# Patient Record
Sex: FEMALE | Race: WHITE | Employment: UNEMPLOYED | URBAN - METROPOLITAN AREA
[De-identification: names, ages, dates, MRNs, and addresses within clinical notes are randomized per-mention and may not be internally consistent; named-entity substitution may affect disease eponyms.]

---

## 2018-02-27 ENCOUNTER — HOSPITAL ENCOUNTER (OUTPATIENT)
Dept: GENERAL RADIOLOGY | Age: 18
Discharge: HOME | End: 2018-02-27
Payer: OTHER GOVERNMENT

## 2018-02-27 DIAGNOSIS — M54.10 RADICULOPATHY AFFECTING UPPER EXTREMITY: ICD-10-CM

## 2018-02-27 PROCEDURE — 72050 X-RAY EXAM NECK SPINE 4/5VWS: CPT

## 2019-08-07 ENCOUNTER — APPOINTMENT (OUTPATIENT)
Dept: GENERAL RADIOLOGY | Age: 19
End: 2019-08-07
Attending: EMERGENCY MEDICINE
Payer: OTHER MISCELLANEOUS

## 2019-08-07 ENCOUNTER — HOSPITAL ENCOUNTER (EMERGENCY)
Age: 19
Discharge: HOME | End: 2019-08-07
Attending: EMERGENCY MEDICINE
Payer: OTHER MISCELLANEOUS

## 2019-08-07 VITALS
RESPIRATION RATE: 16 BRPM | WEIGHT: 140 LBS | HEART RATE: 59 BPM | HEIGHT: 66 IN | BODY MASS INDEX: 22.5 KG/M2 | SYSTOLIC BLOOD PRESSURE: 110 MMHG | DIASTOLIC BLOOD PRESSURE: 73 MMHG | TEMPERATURE: 97.6 F

## 2019-08-07 DIAGNOSIS — S83.006A PATELLAR DISLOCATION, INITIAL ENCOUNTER: Primary | ICD-10-CM

## 2019-08-07 PROCEDURE — 73562 X-RAY EXAM OF KNEE 3: CPT

## 2019-08-07 PROCEDURE — 74011250637 HC RX REV CODE- 250/637: Performed by: EMERGENCY MEDICINE

## 2019-08-07 PROCEDURE — 99284 EMERGENCY DEPT VISIT MOD MDM: CPT

## 2019-08-07 PROCEDURE — 73564 X-RAY EXAM KNEE 4 OR MORE: CPT

## 2019-08-07 RX ORDER — OXYCODONE HYDROCHLORIDE 5 MG/1
5 TABLET ORAL
Qty: 12 TAB | Refills: 0 | Status: SHIPPED | OUTPATIENT
Start: 2019-08-07 | End: 2019-08-10

## 2019-08-07 RX ORDER — HYDROCODONE BITARTRATE AND ACETAMINOPHEN 5; 325 MG/1; MG/1
1 TABLET ORAL
Status: COMPLETED | OUTPATIENT
Start: 2019-08-07 | End: 2019-08-07

## 2019-08-07 RX ORDER — KETOROLAC TROMETHAMINE 30 MG/ML
30 INJECTION, SOLUTION INTRAMUSCULAR; INTRAVENOUS
Status: DISCONTINUED | OUTPATIENT
Start: 2019-08-07 | End: 2019-08-07 | Stop reason: HOSPADM

## 2019-08-07 RX ORDER — OXYCODONE HYDROCHLORIDE 5 MG/1
10 TABLET ORAL
Status: COMPLETED | OUTPATIENT
Start: 2019-08-07 | End: 2019-08-07

## 2019-08-07 RX ADMIN — OXYCODONE HYDROCHLORIDE 10 MG: 5 TABLET ORAL at 14:18

## 2019-08-07 RX ADMIN — HYDROCODONE BITARTRATE AND ACETAMINOPHEN 1 TABLET: 5; 325 TABLET ORAL at 12:25

## 2019-08-07 NOTE — ED NOTES
Pt presents to ED with c/o knee pain. pt given 100mcg of fent and 5mg of versed by EMS. pt dislocated patella at work. pt patella popped back into place during EMS transport.

## 2019-08-07 NOTE — LETTER
[place] 
[facility]ospit[facility] EMERGENCY DEPT 
[address] [facility] [place] [address] 
[phone] Work/School Note Date: 8/7/2019 To Whom It May concern: 
 
Eliezer Gaitan was seen and treated today in the emergency room by the following provider(s): 
Attending Provider: Ana Laura Craig DO. Eliezer Gaitan return to work at the discretion of the orthopaedic provider Sincerely, 
 
 
 
 
Kenan Sanchez DO

## 2019-08-07 NOTE — DISCHARGE INSTRUCTIONS
Patient Education        Kneecap Dislocation: Care Instructions  Your Care Instructions    A sudden twisting or a blow can cause the kneecap (patella) to move out of its normal position. This is called a dislocation. It can happen because of a sports injury--such as turning suddenly while running--or an accident. Rest and home treatment can help you heal and return to your normal activity, usually within 3 to 6 weeks. But you need to be careful after you heal. Now that your kneecap has been dislocated, it can more easily go out of position again. Follow-up care is a key part of your treatment and safety. Be sure to make and go to all appointments, and call your doctor if you are having problems. It's also a good idea to know your test results and keep a list of the medicines you take. How can you care for yourself at home? · Take pain medicines exactly as directed. ? If the doctor gave you a prescription medicine for pain, take it as prescribed. ? If you are not taking a prescription pain medicine, ask your doctor if you can take an over-the-counter medicine. · Rest your knee by not putting weight on your leg until your doctor says it is okay. · Follow instructions for using crutches. · Your doctor may recommend a brace (immobilizer) or elastic bandage to support your knee while it heals. Wear it as directed. · If you have an elastic bandage, make sure it is snug but not so tight that your leg is numb, tingles, or swells below the bandage. You can loosen the bandage if it is too tight. · Put ice or a cold pack on your knee for 10 to 20 minutes at a time. Try to do this every 1 to 2 hours for the next 3 days (when you are awake) or until the swelling goes down. Put a thin cloth between the ice pack and your skin. Do not get the brace or elastic bandage wet. · Prop up your leg on a pillow when you ice it or anytime you sit or lie down for the next 3 days. Try to keep it above the level of your heart.  This will help reduce swelling. · Go to physical therapy if your doctor suggests it. Follow your therapist's instruction for home exercises. When should you call for help? Call 911 anytime you think you may need emergency care. For example, call if:    · You have sudden chest pain and shortness of breath, or you cough up blood.    Call your doctor now or seek immediate medical care if:    · You have signs that your kneecap may be dislocated again, including:  ? Severe pain. ? A misshapen knee that looks like a bone is out of position. ? Not being able to bend or straighten the knee. ? Not being able to walk or bear weight on the knee.     · Your foot is cool or pale or changes color.     · You cannot feel or move your toes or ankle.     · You have signs of a blood clot, such as:  ? Pain in your calf, back of the knee, thigh, or groin. ? Redness and swelling in your leg.    Watch closely for changes in your health, and be sure to contact your doctor if:    · Your pain and swelling get worse. Where can you learn more? Go to http://matthew-jennifer.info/. Enter F300 in the search box to learn more about \"Kneecap Dislocation: Care Instructions. \"  Current as of: September 20, 2018  Content Version: 12.1  © 7294-1735 [employer]. Care instructions adapted under license by [employer] (which disclaims liability or warranty for this information). If you have questions about a medical condition or this instruction, always ask your healthcare professional. [place] [address] any warranty or liability for your use of this information.

## 2019-08-16 NOTE — ED PROVIDER NOTES
EMERGENCY DEPARTMENT HISTORY AND PHYSICAL EXAM      Date: 8/7/2019  Patient Name: Mamadou Womack    History of Presenting Illness     Chief Complaint   Patient presents with    Knee Pain     pt given 100mcg of fent and 5mg of versed by EMS. pt dislocated patella at work. pt patella popped back into place during EMS transport. History Provided By: Patient    HPI: Mamadou Womack, 19 y.o. female with PMHx significant for prior patella dislocation, asthma, presents by EMS to the ED with cc of left knee pain. Pt states she was at work and one of her co-workers came up behind her and and kneed her knee. Pt states at that time her knee buckled and she had immediate pain and deformity to which she described closely resembling lateral patella dislocation. Pt states he was unable to walk. Initially pain 10/10, no alleviating factors and exacerbated by an movement. Pt was given Fentanyl and versed by EMS, and immediately following her patella slid back in to place with improvement in her pain. Upon arrival pt's pain 7/10 worsened by palpation and movement of the leg. Pt has not been able to bear weight since. Pt denies any other pain or injury. There are no other complaints, changes, or physical findings at this time. PCP: Ericka Bobo, DO    No current facility-administered medications on file prior to encounter. Current Outpatient Medications on File Prior to Encounter   Medication Sig Dispense Refill    oxyCODONE-acetaminophen (PERCOCET) 5-325 mg per tablet Take 1 Tab by mouth every four (4) hours as needed for Pain. Max Daily Amount: 6 Tabs. 10 Tab 0       Past History     Past Medical History:  Past Medical History:   Diagnosis Date    Asthma        Past Surgical History:  History reviewed. No pertinent surgical history. Family History:  History reviewed. No pertinent family history.     Social History:  Social History     Tobacco Use    Smoking status: Never Smoker   Substance Use Topics    Alcohol use: Yes     Comment: socially    Drug use: No       Allergies:  No Known Allergies      Review of Systems   Review of Systems   Constitutional: Negative. Negative for appetite change, chills, fatigue and fever. HENT: Negative. Negative for congestion, rhinorrhea, sinus pressure and sore throat. Eyes: Negative. Respiratory: Negative. Negative for cough, choking, chest tightness, shortness of breath and wheezing. Cardiovascular: Negative. Negative for chest pain, palpitations and leg swelling. Gastrointestinal: Negative for abdominal pain, constipation, diarrhea, nausea and vomiting. Endocrine: Negative. Genitourinary: Negative. Negative for difficulty urinating, dysuria, flank pain and urgency. Musculoskeletal: Positive for arthralgias (Left knee). Skin: Negative. Neurological: Negative. Negative for dizziness, speech difficulty, weakness, light-headedness, numbness and headaches. Psychiatric/Behavioral: Negative. All other systems reviewed and are negative. Physical Exam   Physical Exam   Constitutional: She is oriented to person, place, and time. She appears well-developed and well-nourished. No distress. HENT:   Head: Normocephalic and atraumatic. Mouth/Throat: Oropharynx is clear and moist. No oropharyngeal exudate. Eyes: Pupils are equal, round, and reactive to light. Conjunctivae and EOM are normal.   Neck: Normal range of motion. Neck supple. No JVD present. No tracheal deviation present. Cardiovascular: Normal rate, regular rhythm, normal heart sounds and intact distal pulses. No murmur heard. Pulmonary/Chest: Effort normal and breath sounds normal. No stridor. No respiratory distress. She has no wheezes. She has no rales. Musculoskeletal: She exhibits no edema.    Left lower leg non-tender above or below knee, distal PMS intact, patella seated in appropriate position, mild swelling noted, no overlying erythema, decrease ROM due to pain Neurological: She is alert and oriented to person, place, and time. No cranial nerve deficit. No gross motor or sensory deficits    Skin: Skin is warm and dry. She is not diaphoretic. Psychiatric: She has a normal mood and affect. Her behavior is normal.   Nursing note and vitals reviewed. Diagnostic Study Results     Labs -    Radiologic Studies -   XR KNEE LT MIN 4 V   Final Result   IMPRESSION: No evidence of acute fracture or subluxation. .          Medical Decision Making   I am the first provider for this patient. I reviewed the vital signs, available nursing notes, past medical history, past surgical history, family history and social history. Vital Signs-Reviewed the patient's vital signs. Records Reviewed: Nursing Notes and Ambulance Run Sheet    Provider Notes (Medical Decision Making):   DDx- Patella dislocation, Quad tendon rupture, knee sprain    ED Course:   Initial assessment performed. The patients presenting problems have been discussed, and they are in agreement with the care plan formulated and outlined with them. I have encouraged them to ask questions as they arise throughout their visit. Procedure Note - Knee Immobilizer Placement:    Performed by: Jacque Negron DO  Neurovascularly intact prior to tx. A knee immobilizer was placed on pt's left knee. Joint was placed in extension. Neurovascularly intact after tx. The procedure took 1-15 minutes, and pt tolerated well. Critical Care Time:   None    Disposition:  DC f/u with Ortho    PLAN:  1. Discharge Medication List as of 8/7/2019  3:15 PM      START taking these medications    Details   oxyCODONE IR (ROXICODONE) 5 mg immediate release tablet Take 1 Tab by mouth every six (6) hours as needed for Pain for up to 3 days.  Max Daily Amount: 20 mg., Print, Disp-12 Tab, R-0         CONTINUE these medications which have NOT CHANGED    Details   oxyCODONE-acetaminophen (PERCOCET) 5-325 mg per tablet Take 1 Tab by mouth every four (4) hours as needed for Pain. Max Daily Amount: 6 Tabs., Print, Disp-10 Tab, R-0           2. Follow-up Information     Follow up With Specialties Details Why Contact Lakeisha Samaniego, [address] [address] [phone]         Follow up with your Ortho provider     [facility] EMERGENCY DEPT Emergency Medicine  If symptoms worsen [address]  [phone]        Return to ED if worse     Diagnosis     Clinical Impression:   1.  Patellar dislocation, initial encounter

## 2022-10-18 ENCOUNTER — NURSE TRIAGE (OUTPATIENT)
Dept: OTHER | Facility: CLINIC | Age: 22
End: 2022-10-18

## 2022-10-18 NOTE — TELEPHONE ENCOUNTER
Location of patient: [address] sol from Meghna at [facility] with [employer]. Subjective: Caller states \"extreme fatigue\"     Current Symptoms: not getting a lot of sleep, not getting deep sleep  Usually has low iron, will feel dizzy. Feels like cotton in head. No illness  Has had headaches, not today. Will get leg cramps. Onset: 1 week ago; gradual    Associated Symptoms: NA    Pain Severity: denies    Temperature: denies     What has been tried:     LMP: NA Pregnant: No    Recommended disposition: See PCP within 3 Days    Care advice provided, patient verbalizes understanding; denies any other questions or concerns; instructed to call back for any new or worsening symptoms. Patient/Caller agrees with recommended disposition; writer provided warm transfer to Kevin Alonzo at [facility] for appointment scheduling    Attention Provider: Thank you for allowing me to participate in the care of your patient. The patient was connected to triage in response to information provided to the [facility]. Please do not respond through this encounter as the response is not directed to a shared pool.     Reason for Disposition   Fatigue (i.e., tires easily, decreased energy) and persists > 1 week    Protocols used: Weakness (Generalized) and Fatigue-ADULT-OH

## 2022-10-21 ENCOUNTER — OFFICE VISIT (OUTPATIENT)
Dept: INTERNAL MEDICINE CLINIC | Age: 22
End: 2022-10-21
Payer: COMMERCIAL

## 2022-10-21 VITALS
HEIGHT: 66 IN | BODY MASS INDEX: 26.33 KG/M2 | OXYGEN SATURATION: 97 % | TEMPERATURE: 97.8 F | SYSTOLIC BLOOD PRESSURE: 100 MMHG | WEIGHT: 163.8 LBS | HEART RATE: 64 BPM | RESPIRATION RATE: 16 BRPM | DIASTOLIC BLOOD PRESSURE: 76 MMHG

## 2022-10-21 DIAGNOSIS — F41.1 GENERALIZED ANXIETY DISORDER: ICD-10-CM

## 2022-10-21 DIAGNOSIS — D50.8 OTHER IRON DEFICIENCY ANEMIA: ICD-10-CM

## 2022-10-21 DIAGNOSIS — R53.83 FATIGUE, UNSPECIFIED TYPE: Primary | ICD-10-CM

## 2022-10-21 DIAGNOSIS — Z23 ENCOUNTER FOR IMMUNIZATION: ICD-10-CM

## 2022-10-21 PROBLEM — D50.9 IRON DEFICIENCY ANEMIA: Status: ACTIVE | Noted: 2022-10-21

## 2022-10-21 LAB
ANION GAP SERPL CALC-SCNC: 7 MMOL/L (ref 5–15)
BUN SERPL-MCNC: 13 MG/DL (ref 6–20)
BUN/CREAT SERPL: 19 (ref 12–20)
CALCIUM SERPL-MCNC: 9 MG/DL (ref 8.5–10.1)
CHLORIDE SERPL-SCNC: 109 MMOL/L (ref 97–108)
CO2 SERPL-SCNC: 26 MMOL/L (ref 21–32)
CREAT SERPL-MCNC: 0.67 MG/DL (ref 0.55–1.02)
ERYTHROCYTE [DISTWIDTH] IN BLOOD BY AUTOMATED COUNT: 14.8 % (ref 11.5–14.5)
FERRITIN SERPL-MCNC: 4 NG/ML (ref 26–388)
GLUCOSE SERPL-MCNC: 81 MG/DL (ref 65–100)
HCT VFR BLD AUTO: 36.7 % (ref 35–47)
HGB BLD-MCNC: 10.9 G/DL (ref 11.5–16)
IRON SATN MFR SERPL: 3 % (ref 20–50)
IRON SERPL-MCNC: 16 UG/DL (ref 35–150)
MCH RBC QN AUTO: 25.4 PG (ref 26–34)
MCHC RBC AUTO-ENTMCNC: 29.7 G/DL (ref 30–36.5)
MCV RBC AUTO: 85.5 FL (ref 80–99)
NRBC # BLD: 0 K/UL (ref 0–0.01)
NRBC BLD-RTO: 0 PER 100 WBC
PLATELET # BLD AUTO: 471 K/UL (ref 150–400)
PMV BLD AUTO: 8.9 FL (ref 8.9–12.9)
POTASSIUM SERPL-SCNC: 4.7 MMOL/L (ref 3.5–5.1)
RBC # BLD AUTO: 4.29 M/UL (ref 3.8–5.2)
SODIUM SERPL-SCNC: 142 MMOL/L (ref 136–145)
T4 FREE SERPL-MCNC: 1 NG/DL (ref 0.8–1.5)
TIBC SERPL-MCNC: 478 UG/DL (ref 250–450)
TSH SERPL DL<=0.05 MIU/L-ACNC: 0.97 UIU/ML (ref 0.36–3.74)
VIT B12 SERPL-MCNC: 449 PG/ML (ref 193–986)
WBC # BLD AUTO: 3.3 K/UL (ref 3.6–11)

## 2022-10-21 PROCEDURE — 90471 IMMUNIZATION ADMIN: CPT | Performed by: INTERNAL MEDICINE

## 2022-10-21 PROCEDURE — 99203 OFFICE O/P NEW LOW 30 MIN: CPT | Performed by: INTERNAL MEDICINE

## 2022-10-21 PROCEDURE — 90686 IIV4 VACC NO PRSV 0.5 ML IM: CPT | Performed by: INTERNAL MEDICINE

## 2022-10-21 RX ORDER — BUSPIRONE HYDROCHLORIDE 10 MG/1
10 TABLET ORAL 2 TIMES DAILY
COMMUNITY
Start: 2022-10-19

## 2022-10-21 RX ORDER — IRON,CARBONYL/ASCORBIC ACID 65MG-125MG
1 TABLET, DELAYED RELEASE (ENTERIC COATED) ORAL DAILY
COMMUNITY

## 2022-10-21 NOTE — PROGRESS NOTES
10/21/2022    Gustavo Gaitan 2000 is a 23y.o. year old female new patient,   here for evaluation of the following chief complaint(s):  Chief Complaint   Patient presents with    Establish Care    Anemia           ASSESSMENT/PLAN:  Below is the assessment and plan developed based on review of pertinent history, physical exam, labs, studies, and medications. 1. Fatigue, unspecified type  -     VITAMIN B12; Future  -     TSH 3RD GENERATION; Future  -     T4, FREE; Future  -     METABOLIC PANEL, BASIC; Future  -     IRON PROFILE; Future  -     FERRITIN; Future  2. Other iron deficiency anemia  Assessment & Plan:   Still has some fatigue, foot and hand cramping and stiffness  Orders:  -     CBC W/O DIFF; Future  -     VITAMIN B12; Future  -     TSH 3RD GENERATION; Future  -     IRON PROFILE; Future  -     FERRITIN; Future  3. Generalized anxiety disorder  Assessment & Plan:  Mood symptoms controlled with buspar, takes more PRN  Rx by Gyn Dr [facility]  Orders:  -     TSH 3RD GENERATION; Future  -     T4, FREE; Future  4. Encounter for immunization  -     INFLUENZA, FLUARIX, FLULAVAL, FLUZONE (AGE 6 MO+), AFLURIA(AGE 3Y+) IM, PF, 0.5 ML     Lab eval as above for possible worsening anemia vs other metabolic disorder. Rec starting magnesium calm at bedtime to help with sleep and cramping  Advised to get me copy of hematology records or doctors name  Will follow up pending results    Follow-up and Dispositions    Return for annual physical, or sooner as needed. SUBJECTIVE/OBJECTIVE:  Patient with history of anemia, she has seen a hematologist in the past. Unclear about the underlying cause. It has been 2-3 years since testing.   She takes iron supplement off and on and has a balanced diet  Does report fatigue, cramping of hands and feet, feels they are also a bit colder, denies joint swelling or obvious color changes of fingertips  She is able to exercise 3-4 days per week       Review of Systems Constitutional:  Negative for chills and fever. Respiratory:  Negative for cough and shortness of breath. Cardiovascular:  Negative for chest pain, palpitations and leg swelling. Gastrointestinal:  Negative for abdominal pain. Musculoskeletal:         Foot and hand cramping   Skin:  Negative for rash. Neurological:  Positive for headaches. Psychiatric/Behavioral:  The patient is nervous/anxious. Feels like shes not getting restful sleep      Physical Exam  Constitutional:       General: She is not in acute distress. Appearance: Normal appearance. She is not ill-appearing. HENT:      Head: Normocephalic and atraumatic. Eyes:      Conjunctiva/sclera: Conjunctivae normal.   Cardiovascular:      Rate and Rhythm: Normal rate and regular rhythm. Heart sounds: No murmur heard. Pulmonary:      Effort: Pulmonary effort is normal.      Breath sounds: Normal breath sounds. No wheezing. Musculoskeletal:      Right lower leg: No edema. Left lower leg: No edema. Comments: Slightly cool, no deformities   Skin:     General: Skin is warm and dry. Neurological:      General: No focal deficit present. Mental Status: She is alert and oriented to person, place, and time. Mental status is at baseline. Psychiatric:         Mood and Affect: Mood normal.         Thought Content: Thought content normal.         Judgment: Judgment normal.        Vitals:    10/21/22 0941   BP: 100/76   Pulse: 64   Resp: 16   Temp: 97.8 °F (36.6 °C)   TempSrc: Temporal   SpO2: 97%   Weight: 163 lb 12.8 oz (74.3 kg)   Height: 5' 6\" (1.676 m)        The following sections were reviewed & updated as appropriate: Problem List, Allergies, PMH, PSH, FH, and SH.     Patient Active Problem List   Diagnosis Code    Generalized anxiety disorder F41.1    Iron deficiency anemia D50.9        Current Outpatient Medications   Medication Sig Dispense Refill    busPIRone (BUSPAR) 10 mg tablet Take 10 mg by mouth two (2) times a day. iron,carbonyl-vitamin C (Vitron-C) 65 mg iron- 125 mg TbEC Take 1 Tablet by mouth daily. Patient has no known allergies. Social History     Occupational History    Not on file   Tobacco Use    Smoking status: Never    Smokeless tobacco: Never   Substance and Sexual Activity    Alcohol use: Yes     Comment: socially    Drug use: No    Sexual activity: Not on file            Disclaimer:  Aspects of this note may have been generated using Dragon voice recognition software. Despite editing, there may be some syntax errors   We discussed the expected course, resolution and complications of the diagnosis(es) in detail. I have discussed any significant medication side effects and warnings with the patient when indicated. I advised them to contact the office if their condition worsens, changes or fails to improve as anticipated. Patient expressed understanding of the diagnosis and plan. An electronic signature was used to authenticate this note.   -- Maine Antoine MD

## 2022-10-21 NOTE — PROGRESS NOTES
Pt. Is here to establish care. Paige Richard  is a 23 y.o. female  who present for routine immunizations. Prior to vaccine administration: Consent was obtained. Risks and adverse reactions were discussed. The patient was provided the VIS and they were given an opportunity to ask questions; all questions were addressed. She  denies any symptoms, reactions or allergies that would exclude them from being immunized today. There were no adverse reactions observed post vaccination. Patient was advised to seek medical or call the office with any questions or concerns post vaccination. Patient verbalized understanding.    Oli Jay LPN

## 2022-10-24 ENCOUNTER — TELEPHONE (OUTPATIENT)
Dept: INTERNAL MEDICINE CLINIC | Age: 22
End: 2022-10-24

## 2022-10-24 NOTE — TELEPHONE ENCOUNTER
Reason for call:    Patient would like to speak to someone regarding her lab results.     Is this a new problem: yes     Date of last appointment:  10/21/2022     Can we respond via [employer]: no    Best call back number:     Mary Joy - [phone]

## 2022-10-24 NOTE — TELEPHONE ENCOUNTER
Please request records from  [facility] off of [address] in [place].   Also fax them the most recent labs we just did

## 2022-10-25 NOTE — TELEPHONE ENCOUNTER
Faxed request for last 3 OV notes and labs from [facility].   Included our most recent lab work with request.

## 2023-02-15 PROBLEM — K22.70 BARRETTS ESOPHAGUS: Status: ACTIVE | Noted: 2023-02-15

## 2023-04-11 RX ORDER — ACETAMINOPHEN 325 MG/1
650 TABLET ORAL ONCE
Status: COMPLETED | OUTPATIENT
Start: 2023-04-18 | End: 2023-04-18

## 2023-04-11 RX ORDER — DIPHENHYDRAMINE HCL 25 MG
25 CAPSULE ORAL ONCE
Status: COMPLETED | OUTPATIENT
Start: 2023-04-18 | End: 2023-04-18

## 2023-04-11 RX ORDER — SODIUM CHLORIDE 9 MG/ML
25 INJECTION, SOLUTION INTRAVENOUS CONTINUOUS
Status: DISCONTINUED | OUTPATIENT
Start: 2023-04-18 | End: 2023-04-19 | Stop reason: HOSPADM

## 2023-04-18 ENCOUNTER — HOSPITAL ENCOUNTER (OUTPATIENT)
Dept: INFUSION THERAPY | Age: 23
Discharge: HOME | End: 2023-04-18
Payer: COMMERCIAL

## 2023-04-18 VITALS
HEART RATE: 66 BPM | TEMPERATURE: 98.2 F | RESPIRATION RATE: 18 BRPM | SYSTOLIC BLOOD PRESSURE: 91 MMHG | DIASTOLIC BLOOD PRESSURE: 46 MMHG

## 2023-04-18 PROCEDURE — 96365 THER/PROPH/DIAG IV INF INIT: CPT

## 2023-04-18 PROCEDURE — 74011250637 HC RX REV CODE- 250/637: Performed by: INTERNAL MEDICINE

## 2023-04-18 PROCEDURE — 74011250636 HC RX REV CODE- 250/636: Performed by: INTERNAL MEDICINE

## 2023-04-18 PROCEDURE — 74011000258 HC RX REV CODE- 258: Performed by: INTERNAL MEDICINE

## 2023-04-18 PROCEDURE — 96366 THER/PROPH/DIAG IV INF ADDON: CPT

## 2023-04-18 RX ADMIN — SODIUM CHLORIDE 25 ML/HR: 900 INJECTION, SOLUTION INTRAVENOUS at 09:14

## 2023-04-18 RX ADMIN — SODIUM CHLORIDE 975 MG: 9 INJECTION, SOLUTION INTRAVENOUS at 11:50

## 2023-04-18 RX ADMIN — DIPHENHYDRAMINE HYDROCHLORIDE 25 MG: 25 CAPSULE ORAL at 09:16

## 2023-04-18 RX ADMIN — ACETAMINOPHEN 650 MG: 325 TABLET ORAL at 09:16

## 2023-04-18 RX ADMIN — SODIUM CHLORIDE 25 MG: 9 INJECTION, SOLUTION INTRAVENOUS at 10:19

## 2023-04-18 NOTE — PROGRESS NOTES
[facility] Peds/Adult Note                       Date: 2023    Name: Andrews Duke    MRN: [id number]         : 2000    0900 Patient arrives for Dextran without acute problems. Please see [facility] for complete assessment and education provided. Vital signs stable throughout and prior to discharge. Patient tolerated procedure well and was discharged without incident. Patient is aware of no further [facility] appointments and to follow up with referring provider for any questions or concerns. Ms. Castilloyn Jagdish vitals were reviewed prior to and after treatment. Patient Vitals for the past 12 hrs:   Temp Pulse Resp BP   23 1633 -- 66 18 (!) 91/46   23 1558 -- 66 18 (!) 86/51   23 1050 98.2 °F (36.8 °C) (!) 46 20 97/60   23 0903 98 °F (36.7 °C) (!) 57 20 114/64       Lab results:  No results found for this or any previous visit (from the past 12 hour(s)).     Medications given:   Medications Administered       0.9% sodium chloride infusion       Admin Date  2023 Action  New Bag Dose  25 mL/hr Rate  25 mL/hr Route  IntraVENous Administered By  Donato Valencia RN              acetaminophen (TYLENOL) tablet 650 mg       Admin Date  2023 Action  Given Dose  650 mg Route  Oral Administered By  Donato Valencia RN              diphenhydrAMINE (BENADRYL) capsule 25 mg       Admin Date  2023 Action  Given Dose  25 mg Route  Oral Administered By  Donato Valencia RN              iron dextran (INFED) 25 mg in 0.9% sodium chloride 50 mL, overfill volume 5 mL ivpb       Admin Date  2023 Action  New Bag Dose  25 mg Rate  111 mL/hr Route  IntraVENous Administered By  Donato Valencia RN              iron dextran (INFED) 975 mg in 0.9% sodium chloride 500 mL, overfill volume 50 mL IVPB       Admin Date  2023 Action  New Bag Dose  975 mg Rate  142.4 mL/hr Route  IntraVENous Administered By  Donato Valencia RN               Ms. Rachelle Boykin tolerated the infusion, and had no complaints. Ms. Patty Apgar was discharged from [place] [address] in stable condition. Discharge Instructions provided to patient, patient verbalized understanding and was given a copy of d/c instructions. No future appointments.     Lisa Crowell RN  2023  11:19 AM

## 2023-05-23 RX ORDER — IRON,CARBONYL/ASCORBIC ACID 65MG-125MG
1 TABLET, DELAYED RELEASE (ENTERIC COATED) ORAL DAILY
COMMUNITY

## 2023-05-23 RX ORDER — BUSPIRONE HYDROCHLORIDE 10 MG/1
10 TABLET ORAL 2 TIMES DAILY
COMMUNITY
Start: 2022-10-19

## 2024-01-31 ENCOUNTER — OFFICE VISIT (OUTPATIENT)
Age: 24
End: 2024-01-31
Payer: COMMERCIAL

## 2024-01-31 VITALS
BODY MASS INDEX: 28.67 KG/M2 | OXYGEN SATURATION: 99 % | DIASTOLIC BLOOD PRESSURE: 70 MMHG | WEIGHT: 178.4 LBS | TEMPERATURE: 98.1 F | HEIGHT: 66 IN | HEART RATE: 57 BPM | SYSTOLIC BLOOD PRESSURE: 115 MMHG | RESPIRATION RATE: 16 BRPM

## 2024-01-31 DIAGNOSIS — M25.532 LEFT WRIST PAIN: Primary | ICD-10-CM

## 2024-01-31 DIAGNOSIS — D50.8 OTHER IRON DEFICIENCY ANEMIA: ICD-10-CM

## 2024-01-31 DIAGNOSIS — K22.70 BARRETT'S ESOPHAGUS WITHOUT DYSPLASIA: ICD-10-CM

## 2024-01-31 PROCEDURE — G8427 DOCREV CUR MEDS BY ELIG CLIN: HCPCS | Performed by: INTERNAL MEDICINE

## 2024-01-31 PROCEDURE — 99213 OFFICE O/P EST LOW 20 MIN: CPT | Performed by: INTERNAL MEDICINE

## 2024-01-31 PROCEDURE — G8419 CALC BMI OUT NRM PARAM NOF/U: HCPCS | Performed by: INTERNAL MEDICINE

## 2024-01-31 PROCEDURE — 1036F TOBACCO NON-USER: CPT | Performed by: INTERNAL MEDICINE

## 2024-01-31 PROCEDURE — G8484 FLU IMMUNIZE NO ADMIN: HCPCS | Performed by: INTERNAL MEDICINE

## 2024-01-31 SDOH — ECONOMIC STABILITY: HOUSING INSECURITY
IN THE LAST 12 MONTHS, WAS THERE A TIME WHEN YOU DID NOT HAVE A STEADY PLACE TO SLEEP OR SLEPT IN A SHELTER (INCLUDING NOW)?: NO

## 2024-01-31 SDOH — ECONOMIC STABILITY: INCOME INSECURITY: HOW HARD IS IT FOR YOU TO PAY FOR THE VERY BASICS LIKE FOOD, HOUSING, MEDICAL CARE, AND HEATING?: NOT HARD AT ALL

## 2024-01-31 SDOH — ECONOMIC STABILITY: FOOD INSECURITY: WITHIN THE PAST 12 MONTHS, YOU WORRIED THAT YOUR FOOD WOULD RUN OUT BEFORE YOU GOT MONEY TO BUY MORE.: NEVER TRUE

## 2024-01-31 SDOH — ECONOMIC STABILITY: FOOD INSECURITY: WITHIN THE PAST 12 MONTHS, THE FOOD YOU BOUGHT JUST DIDN'T LAST AND YOU DIDN'T HAVE MONEY TO GET MORE.: NEVER TRUE

## 2024-01-31 ASSESSMENT — PATIENT HEALTH QUESTIONNAIRE - PHQ9
2. FEELING DOWN, DEPRESSED OR HOPELESS: 0
SUM OF ALL RESPONSES TO PHQ QUESTIONS 1-9: 0
1. LITTLE INTEREST OR PLEASURE IN DOING THINGS: 0
SUM OF ALL RESPONSES TO PHQ9 QUESTIONS 1 & 2: 0
SUM OF ALL RESPONSES TO PHQ QUESTIONS 1-9: 0

## 2024-01-31 ASSESSMENT — ENCOUNTER SYMPTOMS
COUGH: 0
SHORTNESS OF BREATH: 0
ABDOMINAL PAIN: 0

## 2024-01-31 NOTE — PROGRESS NOTES
1/31/2024    Kimmie Sullivan 2000 is a 23 y.o. year old female established patient,   here for evaluation of the following chief complaint(s):  Chief Complaint   Patient presents with    Wrist Pain     Wrist on left hand with lump has been hurting for a month           ASSESSMENT/PLAN:  Below is the assessment and plan developed based on review of pertinent history, physical exam, labs, studies, and medications.    1. Left wrist pain  -     [facility] - Joce Cunningham MD, Orthopedic Surgery (elbow, hand, wrist), [place]  2. Other iron deficiency anemia  Assessment & Plan:  Has gotten iron infusions  Heme Dr Burris, [facility]  3. Ayala's esophagus without dysplasia  Assessment & Plan:  EGD 2/2023 Dr GOMEZ Irene, [facility], rec repeat EGD in 1 year     Discussed possible ganglion cyst, tendonitis  Will continue with RICE therapy, suggested compression wrap viktor overnight  If not improving, giving info for specialist    No follow-ups on file.       SUBJECTIVE/OBJECTIVE:  Patient has been dealing with persistent left wrist base pain, exacerbated by physical activities, going on for past month or so. She has been working out, doing [employer]. Sometimes feels pain radiating down the left arm as well.    Review of Systems   Constitutional:  Negative for chills and fever.   Respiratory:  Negative for cough and shortness of breath.    Cardiovascular:  Negative for chest pain, palpitations and leg swelling.   Gastrointestinal:  Negative for abdominal pain.   Musculoskeletal:  Positive for arthralgias (L wrist).   Skin:  Negative for rash.          Physical Exam  Constitutional:       General: She is not in acute distress.     Appearance: Normal appearance. She is not ill-appearing.   HENT:      Head: Normocephalic and atraumatic.   Eyes:      Conjunctiva/sclera: Conjunctivae normal.   Pulmonary:      Effort: Pulmonary effort is normal.   Musculoskeletal:      Right wrist: Normal.      Left wrist: Swelling (mild, medially/radial head)

## 2024-01-31 NOTE — PROGRESS NOTES
Chief Complaint   Patient presents with    Wrist Pain     Wrist on left hand with lump has been hurting for a month     Blood pressure 115/70, pulse 57, temperature 98.1 °F (36.7 °C), temperature source Temporal, resp. rate 16, height 1.676 m (5' 6\"), weight 80.9 kg (178 lb 6.4 oz), last menstrual period 01/24/2024, SpO2 99 %.

## 2025-05-05 ENCOUNTER — OFFICE VISIT (OUTPATIENT)
Age: 25
End: 2025-05-05
Payer: COMMERCIAL

## 2025-05-05 VITALS
WEIGHT: 156 LBS | DIASTOLIC BLOOD PRESSURE: 74 MMHG | HEIGHT: 66 IN | SYSTOLIC BLOOD PRESSURE: 128 MMHG | RESPIRATION RATE: 16 BRPM | TEMPERATURE: 98.1 F | OXYGEN SATURATION: 100 % | BODY MASS INDEX: 25.07 KG/M2 | HEART RATE: 57 BPM

## 2025-05-05 DIAGNOSIS — L30.9 DERMATITIS: Primary | ICD-10-CM

## 2025-05-05 PROCEDURE — 99213 OFFICE O/P EST LOW 20 MIN: CPT

## 2025-05-05 RX ORDER — CLOBETASOL PROPIONATE 0.5 MG/G
OINTMENT TOPICAL
Qty: 60 G | Refills: 3 | Status: SHIPPED | OUTPATIENT
Start: 2025-05-05

## 2025-05-05 SDOH — ECONOMIC STABILITY: FOOD INSECURITY: WITHIN THE PAST 12 MONTHS, THE FOOD YOU BOUGHT JUST DIDN'T LAST AND YOU DIDN'T HAVE MONEY TO GET MORE.: NEVER TRUE

## 2025-05-05 SDOH — ECONOMIC STABILITY: TRANSPORTATION INSECURITY
IN THE PAST 12 MONTHS, HAS LACK OF TRANSPORTATION KEPT YOU FROM MEETINGS, WORK, OR FROM GETTING THINGS NEEDED FOR DAILY LIVING?: NO

## 2025-05-05 SDOH — ECONOMIC STABILITY: INCOME INSECURITY: IN THE LAST 12 MONTHS, WAS THERE A TIME WHEN YOU WERE NOT ABLE TO PAY THE MORTGAGE OR RENT ON TIME?: NO

## 2025-05-05 SDOH — ECONOMIC STABILITY: FOOD INSECURITY: WITHIN THE PAST 12 MONTHS, YOU WORRIED THAT YOUR FOOD WOULD RUN OUT BEFORE YOU GOT MONEY TO BUY MORE.: NEVER TRUE

## 2025-05-05 SDOH — ECONOMIC STABILITY: TRANSPORTATION INSECURITY
IN THE PAST 12 MONTHS, HAS THE LACK OF TRANSPORTATION KEPT YOU FROM MEDICAL APPOINTMENTS OR FROM GETTING MEDICATIONS?: NO

## 2025-05-05 ASSESSMENT — PATIENT HEALTH QUESTIONNAIRE - PHQ9
1. LITTLE INTEREST OR PLEASURE IN DOING THINGS: NOT AT ALL
SUM OF ALL RESPONSES TO PHQ QUESTIONS 1-9: 0
2. FEELING DOWN, DEPRESSED OR HOPELESS: NOT AT ALL
SUM OF ALL RESPONSES TO PHQ QUESTIONS 1-9: 0

## 2025-05-05 NOTE — PATIENT INSTRUCTIONS
Try taking Zyretc daily to see if this helps prevent the rashes, if this works then very likely it is allergy-related.    You can use the steroid cream twice daily for up to 2 weeks, after that only use if as needed for flare ups.

## 2025-05-05 NOTE — PROGRESS NOTES
Kimmie Sullivan is a 24 y.o. female who was seen in clinic today (5/5/2025) for an acute visit.      Assessment & Plan:   Below is the assessment and plan developed based on review of pertinent history, physical exam, labs, studies, and medications.    Assessment & Plan  Dermatitis   Acute condition, new,  No specific triggers of note and without systemic symptoms currently. Marked response with antihistamines at home suggests an atopic dermatitis, prescribed topical Clobetasol to use PRN for flare ups along with recommendation for daily Zyrtec and keeping skin moisturized with plain lotion. Can refer to Derm if persists.    Orders:    clobetasol (TEMOVATE) 0.05 % ointment; Apply topically 2 times daily until rashes resolve. Do not use daily for more than 2 weeks.      Subjective/Objective:   Kimmie OVIEDO was seen today for Rash (All over body /3 days /Burning itching, and red /)    Kimmie presents with 3 days of intermittent rashes of the bilateral upper and lower extremities and upper back. Started on Saturday, does not recall any specific triggers. Does not recall spending any time outdoors recently, went out to dinner Friday night downtown. No food allergies that are known. Denies fevers, nor associated joint edema. Rashes seem to go away with PRN Benadryl, also with a dose of Zyrtec this morning. None present currently, brought in pictures with an erythematous rash of the bilateral hands that was taken this morning and has already gone away. Rashes are itchy, flat, and red without pain nor drainage. NO changes in soaps, detergents, fabrics recently. Some mild associated with warm water when showering/washing hands.    Prior to Visit Medications    Medication Sig Taking? Authorizing Provider   clobetasol (TEMOVATE) 0.05 % ointment Apply topically 2 times daily until rashes resolve. Do not use daily for more than 2 weeks. Yes Jessee Edwards MD   busPIRone (BUSPAR) 10 MG tablet Take 1 tablet by mouth 2 times daily Yes